# Patient Record
Sex: FEMALE
[De-identification: names, ages, dates, MRNs, and addresses within clinical notes are randomized per-mention and may not be internally consistent; named-entity substitution may affect disease eponyms.]

---

## 2018-11-15 ENCOUNTER — HOSPITAL ENCOUNTER (EMERGENCY)
Dept: HOSPITAL 42 - ED | Age: 33
Discharge: HOME | End: 2018-11-15
Payer: MEDICAID

## 2018-11-15 VITALS — OXYGEN SATURATION: 99 % | RESPIRATION RATE: 19 BRPM | HEART RATE: 87 BPM

## 2018-11-15 VITALS — DIASTOLIC BLOOD PRESSURE: 80 MMHG | SYSTOLIC BLOOD PRESSURE: 133 MMHG | TEMPERATURE: 98.2 F

## 2018-11-15 DIAGNOSIS — S93.402A: Primary | ICD-10-CM
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
## 2018-11-15 NOTE — RAD
Date of service: 



11/15/2018



PROCEDURE:  Left Ankle Radiographs.



HISTORY:

 ankle pain s/p trauma 



COMPARISON:

None available.



FINDINGS:



BONES:

Normal. No fracture. 



JOINTS:

Normal. No osteoarthritis. Ankle mortise maintained. Talar dome intact



SOFT TISSUES:

Normal. 



OTHER FINDINGS:

None.



IMPRESSION:

Normal left ankle radiographs.

## 2018-11-15 NOTE — ED PDOC
Arrival/HPI





- General


Chief Complaint: Lower Extremity Problem/Injury


Historian: Patient





- History of Present Illness


Narrative History of Present Illness (Text): 





11/15/18 10:18


32yo female with no pmhx who present with complaint of left ankle pain s/p tr

fabi yesterday. States she missed a stair while climbing downstairs yesterday 

and fell, injuring her ankle. States she has been walking with a limp. Did not 

take any medication for the pain. Pain is with weight bearing and ambulation. 

Denies any other complaint.





Past Medical History





- Provider Review


Nursing Documentation Reviewed: Yes





- Psychiatric


Hx Substance Use: No





- Surgical History


Hx Appendectomy: Yes





- Anesthesia


Hx Anesthesia: Yes


Hx Anesthesia Reactions: No


Hx Malignant Hyperthermia: No





Family/Social History





- Physician Review


Nursing Documentation Reviewed: Yes


Family/Social History: Unknown Family HX


Smoking Status: Never Smoked


Hx Alcohol Use: No


Hx Substance Use: No





Allergies/Home Meds


Allergies/Adverse Reactions: 


Allergies





No Known Allergies Allergy (Verified 11/15/18 09:54)


   











Review of Systems





- Physician Review


All systems were reviewed & negative as marked: Yes





- Review of Systems


Constitutional: Normal


Eyes: Normal


ENT: Normal


Respiratory: Normal


Cardiovascular: Normal


Gastrointestinal: Normal


Genitourinary Female: Normal


Musculoskeletal: Arthralgias (Left ankle pain)


Skin: Normal


Neurological: Normal


Endocrine: Normal


Hemo/Lymphatic: Normal


Psychiatric: Normal





Physical Exam


Vital Signs Reviewed: Yes


Temperature: Afebrile


Blood Pressure: Normal


Pulse: Regular


Respiratory Rate: Normal


Appearance: Positive for: Well-Appearing, Non-Toxic, Comfortable


Pain Distress: None


Mental Status: Positive for: Alert and Oriented X 3





- Systems Exam


Head: Present: Atraumatic, Normocephalic


Pupils: Present: PERRL


Extroacular Muscles: Present: EOMI


Conjunctiva: Present: Normal


Mouth: Present: Moist Mucous Membranes


Neck: Present: Normal Range of Motion


Respiratory/Chest: Present: Clear to Auscultation, Good Air Exchange.  No: 

Respiratory Distress, Accessory Muscle Use


Cardiovascular: Present: Regular Rate and Rhythm, Normal S1, S2.  No: Murmurs


Abdomen: No: Tenderness, Distention, Peritoneal Signs


Back: Present: Normal Inspection


Upper Extremity: Present: Normal Inspection.  No: Cyanosis, Edema


Lower Extremity: Present: NORMAL PULSES, Normal ROM, Tenderness (Left ankle 

lateral malleolus), Neurovascularly Intact.  No: Edema, Swelling, Deformity


Neurological: Present: GCS=15, CN II-XII Intact, Speech Normal


Skin: Present: Warm, Dry, Normal Color.  No: Rashes


Psychiatric: Present: Alert, Oriented x 3, Normal Insight, Normal Concentration





Medical Decision Making


ED Course and Treatment: 





11/15/18 19:19


Left ankle xray - No acute fracture.





Ace wrap applied.





Result was DW the pt and she was referred to the PMD/ortho





- RAD Interpretation


Radiology Orders: 











11/15/18 10:13


ANKLE LEFT 3 VIEWS ROUTINE [RAD] Stat 














- Medication Orders


Current Medication Orders: 














Discontinued Medications





Ibuprofen (Motrin Tab)  600 mg PO STAT STA


   Stop: 11/15/18 10:14











Disposition/Present on Arrival





- Present on Arrival


Any Indicators Present on Arrival: No


History of DVT/PE: No


History of Uncontrolled Diabetes: No


Urinary Catheter: No


History of Decub. Ulcer: No


History Surgical Site Infection Following: None





- Disposition


Have Diagnosis and Disposition been Completed?: Yes


Diagnosis: 


 Ankle sprain





Disposition: HOME/ ROUTINE


Disposition Time: 11:40


Patient Plan: Discharge


Condition: STABLE


Discharge Instructions (ExitCare):  Ankle Sprain (DC)


Additional Instructions: 


Rest, Ice, compress and elevate ankle


Follow up with your Doctor


Return to ED for new or worsening complaint


Prescriptions: 


RX: Ibuprofen [Motrin Tab] 600 mg PO Q6 #20 tab


Referrals: 


Yamil Marcelo [Primary Care Provider] - Follow up with primary


Forms:  [employer] (English)